# Patient Record
Sex: FEMALE | Race: WHITE | NOT HISPANIC OR LATINO | URBAN - METROPOLITAN AREA
[De-identification: names, ages, dates, MRNs, and addresses within clinical notes are randomized per-mention and may not be internally consistent; named-entity substitution may affect disease eponyms.]

---

## 2017-06-20 ENCOUNTER — APPOINTMENT (RX ONLY)
Dept: URBAN - METROPOLITAN AREA CLINIC 17 | Facility: CLINIC | Age: 67
Setting detail: DERMATOLOGY
End: 2017-06-20

## 2017-06-20 DIAGNOSIS — Z85.828 PERSONAL HISTORY OF OTHER MALIGNANT NEOPLASM OF SKIN: ICD-10-CM

## 2017-06-20 DIAGNOSIS — L81.5 LEUKODERMA, NOT ELSEWHERE CLASSIFIED: ICD-10-CM

## 2017-06-20 DIAGNOSIS — L90.8 OTHER ATROPHIC DISORDERS OF SKIN: ICD-10-CM

## 2017-06-20 DIAGNOSIS — L81.4 OTHER MELANIN HYPERPIGMENTATION: ICD-10-CM

## 2017-06-20 DIAGNOSIS — L71.8 OTHER ROSACEA: ICD-10-CM

## 2017-06-20 DIAGNOSIS — D22 MELANOCYTIC NEVI: ICD-10-CM

## 2017-06-20 PROBLEM — D22.5 MELANOCYTIC NEVI OF TRUNK: Status: ACTIVE | Noted: 2017-06-20

## 2017-06-20 PROCEDURE — 99214 OFFICE O/P EST MOD 30 MIN: CPT

## 2017-06-20 PROCEDURE — ? PRESCRIPTION

## 2017-06-20 PROCEDURE — ? COUNSELING

## 2017-06-20 PROCEDURE — ? PRODUCT LINE (PHARMACEUTICALS)

## 2017-06-20 RX ORDER — METRONIDAZOLE 7.5 MG/G
CREAM TOPICAL QD
Qty: 1 | Refills: 3 | Status: ERX | COMMUNITY
Start: 2017-06-20

## 2017-06-20 RX ADMIN — METRONIDAZOLE: 7.5 CREAM TOPICAL at 21:24

## 2017-06-20 ASSESSMENT — LOCATION SIMPLE DESCRIPTION DERM
LOCATION SIMPLE: LEFT UPPER BACK
LOCATION SIMPLE: RIGHT BACK
LOCATION SIMPLE: LEFT PRETIBIAL REGION
LOCATION SIMPLE: LEFT CHEEK
LOCATION SIMPLE: RIGHT PRETIBIAL REGION
LOCATION SIMPLE: RIGHT CHEEK

## 2017-06-20 ASSESSMENT — LOCATION ZONE DERM
LOCATION ZONE: TRUNK
LOCATION ZONE: LEG
LOCATION ZONE: FACE

## 2017-06-20 ASSESSMENT — LOCATION DETAILED DESCRIPTION DERM
LOCATION DETAILED: LEFT SUPERIOR UPPER BACK
LOCATION DETAILED: RIGHT MEDIAL MALAR CHEEK
LOCATION DETAILED: RIGHT SUPERIOR LATERAL UPPER BACK
LOCATION DETAILED: LEFT PROXIMAL PRETIBIAL REGION
LOCATION DETAILED: RIGHT PROXIMAL PRETIBIAL REGION
LOCATION DETAILED: LEFT CENTRAL MALAR CHEEK

## 2017-06-20 NOTE — PROCEDURE: PRODUCT LINE (PHARMACEUTICALS)
Product 39 Units: 0
Product 6 Price (In Dollars - Numeric Only, No Special Characters Or $): 50.00
Product 31 Price (In Dollars - Numeric Only, No Special Characters Or $): 0.00
Product 2 Application Directions: Apply to face once a day.
Product 7 Application Directions: Apply to effected areas once a day
Product 12 Application Directions: Apply to affected areas twice a day for 3 months.
Detail Level: Zone
Name Of Product 3: Melasma Emulsion
Product 1 Application Directions: Apply to scalp nightly for two weeks and face 2-3 times a week. Take one week off between treatments . Use as needed for flares.
Name Of Product 8: Tretinoin 0.1 % cream
Send Charges To Patient Encounter: Yes
Product 10 Application Directions: Apply pea size amount to entire face twice a week and work up to nightly as tolerated.
Name Of Product 6: Clobetasol shampoo
Product 11 Application Directions: Apply pea size amount to entire face twice a week working up to nightly as tolerated.
Name Of Product 10: Acne Triple Gel Combination
Name Of Product 11: Tazarotene Hydrating Cream
Product 5 Application Directions: Apply pea size amount to entire face two times a week and work up to nightly as tolerated.
Name Of Product 4: Tretinoin 0.025% cream
Name Of Product 2: Rosacea triple gel
Name Of Product 5: Tretinoin 0.05% cream
Product 3 Application Directions: Apply to entire arms nightly for 12 weeks
Product 13 Application Directions: Apply to affected areas twice a day for two weeks taking one week off between treatments.
Name Of Product 12: Clobetasol Cream
Name Of Product 14: Seborrheic dermatitis
Name Of Product 1: Clobetasol Foam
Product 4 Units: 1
Name Of Product 13: Dermatitis Foam
Name Of Product 9: Tacrolimus Ointment 0.1 %
Product 6 Application Directions: Wash the scalp two to three times a week.
Product 14 Application Directions: Apply to affected area twice a day for two weeks. Then reduce to 2-3 times s week as needed.
Product 4 Application Directions: Apply twice weekly and gradually work up to nightly as tolerated
Product 9 Application Directions: Apply to affected areas twice a day.